# Patient Record
Sex: FEMALE
[De-identification: names, ages, dates, MRNs, and addresses within clinical notes are randomized per-mention and may not be internally consistent; named-entity substitution may affect disease eponyms.]

---

## 2021-05-08 ENCOUNTER — HOSPITAL ENCOUNTER (OUTPATIENT)
Dept: HOSPITAL 53 - M LDO | Age: 31
Discharge: HOME | End: 2021-05-08
Attending: OBSTETRICS & GYNECOLOGY
Payer: COMMERCIAL

## 2021-05-08 VITALS — HEIGHT: 62 IN | BODY MASS INDEX: 26.74 KG/M2 | WEIGHT: 145.28 LBS

## 2021-05-08 VITALS — SYSTOLIC BLOOD PRESSURE: 103 MMHG | DIASTOLIC BLOOD PRESSURE: 55 MMHG

## 2021-05-08 DIAGNOSIS — R10.2: ICD-10-CM

## 2021-05-08 DIAGNOSIS — O26.893: Primary | ICD-10-CM

## 2021-05-08 DIAGNOSIS — Z3A.36: ICD-10-CM

## 2021-05-08 LAB
APPEARANCE UR: CLEAR
BACTERIA UR QL AUTO: NEGATIVE
BILIRUB UR QL STRIP.AUTO: NEGATIVE
GLUCOSE UR QL STRIP.AUTO: NEGATIVE MG/DL
HGB UR QL STRIP.AUTO: NEGATIVE
KETONES UR QL STRIP.AUTO: NEGATIVE MG/DL
LEUKOCYTE ESTERASE UR QL STRIP.AUTO: NEGATIVE
MUCOUS THREADS URNS QL MICRO: (no result)
NITRITE UR QL STRIP.AUTO: NEGATIVE
PH UR STRIP.AUTO: 5 UNITS (ref 5–9)
PROT UR QL STRIP.AUTO: NEGATIVE MG/DL
RBC # UR AUTO: 0 /HPF (ref 0–3)
SP GR UR STRIP.AUTO: 1.01 (ref 1–1.03)
SQUAMOUS #/AREA URNS AUTO: 4 /HPF (ref 0–6)
UROBILINOGEN UR QL STRIP.AUTO: 0.2 MG/DL (ref 0–2)
WBC #/AREA URNS AUTO: 1 /HPF (ref 0–3)

## 2021-05-08 NOTE — IPNPDOC
Text Note


Date of Service


The patient was seen on 21.





NOTE


Labor and Delivery Triage Note:


S: 30yo  at 36w5d presents with c/o abdominal discomfort and pelvic 

pressure. Denies contraction, vaginal bleeding or LOF. Reports active fetal 

movement. Visiting from [place].





O: vss, AF no ctx


Cat 1 fetal tracing, no contractions


Gen: well appearing, NAD


Abd: gravid, soft, nttp








A/P: 30yo  not in PTL, abdominal muscle strain


reassuring fetal status


-Tylenol for discomfort


-home with PTL precautions and FKCs.


-f/u at next OB appt





Nafisa Haddad MD





VS,Lucie, I+O


VS, Lucie I+O





Vital Signs








  Date Time  Temp Pulse Resp B/P (MAP) Pulse Ox O2 Delivery O2 Flow Rate FiO2


 


21 15:10 98.6 83 16 103/55 (71)    

















NAFISA HADDAD MD.                  May 8, 2021 20:48